# Patient Record
Sex: MALE | Employment: FULL TIME | URBAN - METROPOLITAN AREA
[De-identification: names, ages, dates, MRNs, and addresses within clinical notes are randomized per-mention and may not be internally consistent; named-entity substitution may affect disease eponyms.]

---

## 2024-01-31 ENCOUNTER — OFFICE VISIT (OUTPATIENT)
Dept: ORTHOPEDIC SURGERY | Age: 64
End: 2024-01-31
Payer: OTHER GOVERNMENT

## 2024-01-31 DIAGNOSIS — S62.231A OTHER CLOSED DISPLACED FRACTURE OF BASE OF FIRST METACARPAL BONE OF RIGHT HAND, INITIAL ENCOUNTER: ICD-10-CM

## 2024-01-31 DIAGNOSIS — M79.644 PAIN OF RIGHT THUMB: Primary | ICD-10-CM

## 2024-01-31 PROCEDURE — 99204 OFFICE O/P NEW MOD 45 MIN: CPT | Performed by: NURSE PRACTITIONER

## 2024-01-31 RX ORDER — LISINOPRIL 40 MG/1
TABLET ORAL
COMMUNITY
Start: 2023-12-18

## 2024-01-31 RX ORDER — ICOSAPENT ETHYL 1000 MG/1
CAPSULE ORAL 2 TIMES DAILY
COMMUNITY
Start: 2023-12-18

## 2024-01-31 RX ORDER — ATORVASTATIN CALCIUM 40 MG/1
TABLET, FILM COATED ORAL DAILY
COMMUNITY
Start: 2023-12-18

## 2024-01-31 RX ORDER — TAMSULOSIN HYDROCHLORIDE 0.4 MG/1
CAPSULE ORAL
COMMUNITY
Start: 2023-10-24

## 2024-01-31 NOTE — PROGRESS NOTES
Orthopaedic Hand Clinic Note    Name: Wan Pate  YOB: 1960  Gender: male  MRN: [id number]      CC: Patient referred for evaluation of right thumb pain    HPI: Wan Pate is a 63 y.o. male right hand dominant with a chief complaint of right thumb.  He reports on Sunday, January 21, 2024 he was going up the stairs and slipped on a magazine.  He landed on an outstretched right hand.  He was seen in urgent care on Monday.  He was x-rayed.  He was placed in a thumb spica splint.  He works at a [employer].  He plays golf.  He has a history of a CVA approximately 4 years ago.  He is taking a baby aspirin daily.    ROS/Meds/PSH/PMH/FH/SH: I personally reviewed the patients standard intake form.  Pertinents are discussed in the HPI    Physical Examination:  General: Awake and alert.  HEENT: Normocephalic, atraumatic  CV/Pulm: Breathing even and unlabored  Skin: No obvious rashes noted.  Lymphatic: No obvious evidence of lymphedema or lymphadenopathy    Musculoskeletal Exam:  Examination on the right upper extremity demonstrates cap refill < 5 seconds in all fingers  Patient has swelling and ecchymosis to the right thumb and hand.  He has decreased range of motion secondary to pain.  He is tender to palpation over the base of the first metacarpal.  He denies paresthesia.  He has good capillary refill.    Imaging / Electrodiagnostic Tests:     X-rays include a 3 view right thumb are reviewed.  He has a fracture at the base of the first metacarpal.  It has displaced since his urgent care films.    Assessment:   1. Pain of right thumb    2. Other closed displaced fracture of base of first metacarpal bone of right hand, initial encounter        Plan:   We discussed the diagnosis and different treatment options. We discussed observation, therapy, antiinflammatory medications and other pertinent treatment modalities.    After discussing in detail the patient elects to proceed with surgical  O.V 1/2/19  Reduce prednisone to 4 mg daily. Come for labs on the 16th. Thanks.     Patient been on 4mg daily since 1/4/19

## 2024-02-01 DIAGNOSIS — S62.231A OTHER CLOSED DISPLACED FRACTURE OF BASE OF FIRST METACARPAL BONE OF RIGHT HAND, INITIAL ENCOUNTER: Primary | ICD-10-CM

## 2024-02-01 RX ORDER — OXYCODONE HYDROCHLORIDE 5 MG/1
5 TABLET ORAL EVERY 6 HOURS PRN
Qty: 20 TABLET | Refills: 0 | Status: CANCELLED | OUTPATIENT
Start: 2024-02-01 | End: 2024-02-06

## 2024-02-01 RX ORDER — ONDANSETRON 4 MG/1
4 TABLET, FILM COATED ORAL EVERY 8 HOURS PRN
Qty: 20 TABLET | Refills: 0 | Status: CANCELLED | OUTPATIENT
Start: 2024-02-01

## 2024-02-02 ENCOUNTER — TELEPHONE (OUTPATIENT)
Dept: ORTHOPEDIC SURGERY | Age: 64
End: 2024-02-02

## 2024-02-02 NOTE — TELEPHONE ENCOUNTER
Pt would like to cxl sx and see if he can speak with Dr. Wood or wing. Pt and I voiced understanding.

## 2024-02-06 ENCOUNTER — EVALUATION (OUTPATIENT)
Age: 64
End: 2024-02-06
Payer: OTHER GOVERNMENT

## 2024-02-06 ENCOUNTER — OFFICE VISIT (OUTPATIENT)
Dept: ORTHOPEDIC SURGERY | Age: 64
End: 2024-02-06

## 2024-02-06 DIAGNOSIS — M79.644 PAIN OF FINGER OF RIGHT HAND: ICD-10-CM

## 2024-02-06 DIAGNOSIS — S62.231A OTHER CLOSED DISPLACED FRACTURE OF BASE OF FIRST METACARPAL BONE OF RIGHT HAND, INITIAL ENCOUNTER: Primary | ICD-10-CM

## 2024-02-06 DIAGNOSIS — S62.231P: Primary | ICD-10-CM

## 2024-02-06 PROCEDURE — L3808 WHFO, RIGID W/O JOINTS: HCPCS | Performed by: OCCUPATIONAL THERAPIST

## 2024-02-06 NOTE — PROGRESS NOTES
EVALUATION:   Yes    PLAN:   Follow up with MD in 2-3 weeks for repeat xrays and will initiate motion as indicated.

## 2024-02-07 NOTE — PROGRESS NOTES
Orthopaedic Hand Clinic Note    Name: Wan Pate  Age: 63 y.o.  YOB: 1960  Gender: male  MRN: [id number]      Follow up visit:   1. Other closed displaced fracture of base of first metacarpal bone of right hand, initial encounter        HPI: Wan Pate is a 63 y.o. male who is following up for first metacarpal base fracture, we have evaluated the patient last week and patient scheduled open reduction internal fixation surgery, he wants to discuss treatments again, he has a lot going on in his life and thinks avoiding surgery could be best for him.      ROS/Meds/PSH/PMH/FH/SH: I personally reviewed the patients standard intake form.  Pertinents are discussed in the HPI    Physical Examination:  General: Awake and alert.  HEENT: Normocephalic, atraumatic  CV/Pulm: Breathing even and unlabored  Skin: No obvious rashes noted.  Lymphatic: No obvious evidence of lymphedema or lymphadenopathy    Musculoskeletal Examination:  Examination on the right upper extremity demonstrates cap refill < 5 seconds in all fingers, tender palpation of the base of the first metacarpal, there is certainly some abduction loss compared to contralateral side but he has a fairly good thumb position.    Imaging / Electrodiagnostic Tests:     X-ray of the right hand were again reviewed, there is a first metacarpal base fracture which is extra-articular, there is 30 degrees of flexion deformity    Assessment:   1. Other closed displaced fracture of base of first metacarpal bone of right hand, initial encounter        Plan:   We discussed the diagnosis and different treatment options. We discussed observation, therapy, antiinflammatory medications and other pertinent treatment modalities.    After discussing in detail the patient elects to proceed with custom-made therapy splint, I discussed all treatment options in detail with the patient, he understands that without surgery this fracture will heal in 30 to 40

## 2024-02-27 ENCOUNTER — OFFICE VISIT (OUTPATIENT)
Dept: ORTHOPEDIC SURGERY | Age: 64
End: 2024-02-27

## 2024-02-27 DIAGNOSIS — S62.231A OTHER CLOSED DISPLACED FRACTURE OF BASE OF FIRST METACARPAL BONE OF RIGHT HAND, INITIAL ENCOUNTER: Primary | ICD-10-CM

## 2024-02-27 PROCEDURE — 99024 POSTOP FOLLOW-UP VISIT: CPT | Performed by: ORTHOPAEDIC SURGERY

## 2024-02-27 NOTE — PROGRESS NOTES
1. Other closed displaced fracture of base of first metacarpal bone of right hand, initial encounter      Patient is having pain at the base of the thumb, tenderness palpation is more at the CMC joint rather than the metacarpal, x-rays demonstrate interval formation of callus consistent with healing, advised the patient to resume activities as tolerated without the brace, I will reassess in 2 months, if at that point he continues to have pain we may try a steroid injection to the CMC joint    right Hand XR: AP, Lateral, Oblique and Thumb CMC joint     Clinical Indication:  1. Other closed displaced fracture of base of first metacarpal bone of right hand, initial encounter           Report: AP, lateral, oblique and thumb CMC joint hand XRs demonstrates healing right first metacarpal base fracture with profuse callus    Impression: as above     MD Josee Coppola Jr, MD, PhD  Orthopaedic Surgery  02/27/24  11:48 AM